# Patient Record
Sex: MALE | Race: WHITE | NOT HISPANIC OR LATINO | Employment: FULL TIME | URBAN - METROPOLITAN AREA
[De-identification: names, ages, dates, MRNs, and addresses within clinical notes are randomized per-mention and may not be internally consistent; named-entity substitution may affect disease eponyms.]

---

## 2017-04-03 ENCOUNTER — OFFICE VISIT - HEALTHEAST (OUTPATIENT)
Dept: FAMILY MEDICINE | Facility: CLINIC | Age: 52
End: 2017-04-03

## 2017-04-03 DIAGNOSIS — J06.9 VIRAL URI: ICD-10-CM

## 2018-01-17 ENCOUNTER — OFFICE VISIT (OUTPATIENT)
Dept: URGENT CARE | Facility: URGENT CARE | Age: 53
End: 2018-01-17
Payer: COMMERCIAL

## 2018-01-17 VITALS
SYSTOLIC BLOOD PRESSURE: 130 MMHG | TEMPERATURE: 98.3 F | HEART RATE: 98 BPM | OXYGEN SATURATION: 96 % | DIASTOLIC BLOOD PRESSURE: 90 MMHG

## 2018-01-17 DIAGNOSIS — S01.511A LIP LACERATION, INITIAL ENCOUNTER: Primary | ICD-10-CM

## 2018-01-17 PROCEDURE — 12011 RPR F/E/E/N/L/M 2.5 CM/<: CPT | Performed by: FAMILY MEDICINE

## 2018-01-17 NOTE — MR AVS SNAPSHOT
"              After Visit Summary   2018    Ty Silverman    MRN: [id number]           Patient Information     Date Of Birth          1965        Visit Information        Provider Department      2018 5:45 PM Jaunjo Connors MD [facility] Urgent [facility]        Today's Diagnoses     Lip laceration, initial encounter    -  1       Follow-ups after your visit        Who to contact     If you have questions or need follow up information about today's clinic visit or your schedule please contact [facility] directly at [phone].  Normal or non-critical lab and imaging results will be communicated to you by [employer], letter or phone within 4 business days after the clinic has received the results. If you do not hear from us within 7 days, please contact the clinic through [employer] or phone. If you have a critical or abnormal lab result, we will notify you by phone as soon as possible.  Submit refill requests through [email or web address] or call your pharmacy and they will forward the refill request to us. Please allow 3 business days for your refill to be completed.          Additional Information About Your Visit        MyChart Information     [email or web address] lets you send messages to your doctor, view your test results, renew your prescriptions, schedule appointments and more. To sign up, go to [email or web address] . Click on \"Log in\" on the left side of the screen, which will take you to the Welcome page. Then click on \"Sign up Now\" on the right side of the page.     You will be asked to enter the access code listed below, as well as some personal information. Please follow the directions to create your username and password.     Your access code is: [email or web address]  Expires: 2018  6:41 PM     Your access code will  in 90 days. If you need help or a new code, please call your [facility] clinic or [phone].        Care EveryWhere ID     This is your Care EveryWhere ID. This could be used by " other organizations to access your [place] medical records  [id number]        Your Vitals Were     Pulse Temperature Pulse Oximetry             98 98.3  F (36.8  C) (Oral) 96%          Blood Pressure from Last 3 Encounters:   01/17/18 130/90    Weight from Last 3 Encounters:   No data found for Wt              We Performed the Following     REPR SUPERF WND FACE <2.5CM [[id number]]        Primary Care Provider Fax #    Physician No Ref-Primary [phone]       No address on file        Equal Access to Services     JONY HELLER : Hadii aad ku hadasho Soomaali, waaxda luqadaha, qaybta kaalmada adeegyada, waxay idiin hayaan adeeg kharacecilia laonel . So [facility] [phone].    ATENCIÓN: Si habla español, tiene a ferraro disposición servicios gratuitos de asistencia lingüística. [facility] [phone].    We comply with applicable federal civil rights laws and [place] laws. We do not discriminate on the basis of race, color, national origin, age, disability, sex, sexual orientation, or gender identity.            Thank you!     Thank you for choosing [facility]  for your care. Our goal is always to provide you with excellent care. Hearing back from our patients is one way we can continue to improve our services. Please take a few minutes to complete the written survey that you may receive in the mail after your visit with us. Thank you!             Your Updated Medication List - Protect others around you: Learn how to safely use, store and throw away your medicines at www.disposemymeds.org.      Notice  As of 1/17/2018  6:41 PM    You have not been prescribed any medications.

## 2018-01-18 NOTE — PROGRESS NOTES
SUBJECTIVE:   52 year old male sustained laceration of upper lip 1 hour ago. Nature of injury: dog bite. Tetanus vaccination status reviewed: tetanus re-vaccination not indicated.     OBJECTIVE:   Patient appears well, vitals are normal. Laceration 5 mm L upper lip noted.  Description: clean wound edges, no foreign bodies. Neurovascular and tendon structures are intact.  Crosses vermillion border    ASSESSMENT:   1. Lip laceration, initial encounter      PLAN:   Anesthesia with LET. Wound cleansed, debrided of visible foreign material and necrotic tissue, and sutured.   Pt instructed to come back to the clinic for worsening sx    Return for suture removal in 5-6 days.   6-0 Nylon x 1 SI   Pt tolerated well

## 2021-04-05 ENCOUNTER — AMBULATORY - HEALTHEAST (OUTPATIENT)
Dept: NURSING | Facility: CLINIC | Age: 56
End: 2021-04-05

## 2021-04-26 ENCOUNTER — AMBULATORY - HEALTHEAST (OUTPATIENT)
Dept: NURSING | Facility: CLINIC | Age: 56
End: 2021-04-26

## 2021-05-30 VITALS — WEIGHT: 214.9 LBS

## 2021-06-09 NOTE — PROGRESS NOTES
Assessment:     1. Viral URI            Plan:     Recommend oral rehydration with pedialyte (for children) or Gatorade (for adults), drinking small amount of fluids frequently.  Advance diet as tolerated.  Follow up if not able to keep fluids down, becoming lethargic, having high fevers, or severe abdominal pain.      Subjective:       51 y.o. male presents for evaluation of a couple week history of productive cough off and on.  Initially he had some chills and more nasal congestion but now his symptoms are just primarily the cough.  It is significantly better than it was last week.  It is mostly in the morning and is productive of yellowish sputum.  He has been still continuing to be a bit fatigued.  He denies any shortness of breath, current nasal congestion, sore throat, or ear pain.  He has not had any recent fevers.  He has not tried anything for his symptoms.  He has continued to be able to go to work daily.    The following portions of the patient's history were reviewed and updated as appropriate: allergies, current medications, past family history, past medical history, past social history, past surgical history and problem list.    Review of Systems  A 12 point comprehensive review of systems was negative except as noted.     Objective:        Vitals:    04/03/17 1214   BP: 128/88   Pulse: 84   Resp: 14   Temp: 98.2  F (36.8  C)   SpO2: 95%     General Appearance:    Alert, pleasant, cooperative, no distress, appears stated age   Head:    Normocephalic, without obvious abnormality, atraumatic   Eyes:    Conjunctiva/corneas clear   Ears:    Normal TM's without erythema or bulging. Radha external ear canals, both ears   Nose:   Nares normal, septum midline, mucosa normal, no drainage    or sinus tenderness   Throat:   Lips, mucosa, and tongue normal; teeth and gums normal.  No tonsilar hypertrophy or exudate.   Neck:    Cardiovascular:   Supple, symmetrical, trachea midline, no adenopathy;     thyroid:  no  enlargement/tenderness/nodules  Regular rate and rhythm, no murmurs, rubs, or gallops.   Lungs:     Clear to auscultation bilaterally without wheezes, rales, or rhonchi, respirations unlabored                          This note has been dictated using voice recognition software. Any grammatical or context distortions are unintentional and inherent to the software

## 2021-10-10 ENCOUNTER — HEALTH MAINTENANCE LETTER (OUTPATIENT)
Age: 56
End: 2021-10-10

## 2022-09-17 ENCOUNTER — HEALTH MAINTENANCE LETTER (OUTPATIENT)
Age: 57
End: 2022-09-17

## 2022-11-12 ENCOUNTER — OFFICE VISIT (OUTPATIENT)
Dept: URGENT CARE | Facility: URGENT CARE | Age: 57
End: 2022-11-12
Payer: COMMERCIAL

## 2022-11-12 VITALS
OXYGEN SATURATION: 97 % | HEART RATE: 72 BPM | TEMPERATURE: 97.1 F | SYSTOLIC BLOOD PRESSURE: 155 MMHG | DIASTOLIC BLOOD PRESSURE: 101 MMHG | WEIGHT: 215 LBS

## 2022-11-12 DIAGNOSIS — M10.9 ACUTE GOUT INVOLVING TOE OF RIGHT FOOT, UNSPECIFIED CAUSE: Primary | ICD-10-CM

## 2022-11-12 PROCEDURE — 99203 OFFICE O/P NEW LOW 30 MIN: CPT | Performed by: PHYSICIAN ASSISTANT

## 2022-11-12 RX ORDER — INDOMETHACIN 50 MG/1
50 CAPSULE ORAL
Qty: 21 CAPSULE | Refills: 0 | Status: SHIPPED | OUTPATIENT
Start: 2022-11-12

## 2022-11-12 RX ORDER — PREDNISONE 20 MG/1
40 TABLET ORAL DAILY
Qty: 10 TABLET | Refills: 0 | Status: SHIPPED | OUTPATIENT
Start: 2022-11-12 | End: 2022-11-17

## 2022-11-13 NOTE — PROGRESS NOTES
Assessment & Plan     Acute gout involving toe of right foot, unspecified cause  - predniSONE (DELTASONE) 20 MG tablet  Dispense: 10 tablet; Refill: 0  - indomethacin (INDOCIN) 50 MG capsule  Dispense: 21 capsule; Refill: 0     Pt presents with pain of R great toe that began yesterday. Area is erythematous, warm and painful consistent with gout. Pt will begin prednisone and indomethacin, he will discontinue tylenol and ibuprofen. Pt reports consuming large amounts of red meat, discussed dietary changes and gout triggers. Pt will follow up if symptoms are not controlled with new medications.       Anyi Marcus PA-C  [facility] LIDYA Crawford is a 57 year old male who presents to clinic today for the following health issues:  Chief Complaint   Patient presents with     Pain     Right foot pain, notes that it may be gout      HPI    Pt reports R foot pain that started yesterday  Has not had this before  10/10 pain, mobility is impacted   Area is redden and warm  Has been taking ibuprofen/tylenol       No past medical history on file.  Current Outpatient Medications   Medication     indomethacin (INDOCIN) 50 MG capsule     predniSONE (DELTASONE) 20 MG tablet     No current facility-administered medications for this visit.     Social History     Socioeconomic History     Marital status:      Spouse name: Not on file     Number of children: Not on file     Years of education: Not on file     Highest education level: Not on file   Occupational History     Not on file   Tobacco Use     Smoking status: Never     Smokeless tobacco: Not on file   Substance and Sexual Activity     Alcohol use: Not on file     Drug use: Not on file     Sexual activity: Not on file   Other Topics Concern     Not on file   Social History Narrative     Not on file     Social Determinants of Health     Financial Resource Strain: Not on file   Food Insecurity: Not on file   Transportation Needs: Not on  file   Physical Activity: Not on file   Stress: Not on file   Social Connections: Not on file   Intimate Partner Violence: Not on file   Housing Stability: Not on file         Review of Systems  Detailed as above       Objective    BP (!) 155/101   Pulse 72   Temp 97.1  F (36.2  C) (Oral)   Wt 97.5 kg (215 lb)   SpO2 97%   Physical Exam  Cardiovascular:      Rate and Rhythm: Normal rate and regular rhythm.      Heart sounds: Normal heart sounds.   Pulmonary:      Effort: Pulmonary effort is normal.      Breath sounds: Normal breath sounds.   Musculoskeletal:      Right foot: Decreased range of motion.   Feet:      Right foot:      Skin integrity: Erythema and warmth present.   Skin:     General: Skin is warm and dry.   Neurological:      Mental Status: He is alert.   Psychiatric:         Mood and Affect: Mood normal.

## 2023-01-02 ENCOUNTER — OFFICE VISIT (OUTPATIENT)
Dept: PODIATRY | Facility: CLINIC | Age: 58
End: 2023-01-02
Payer: COMMERCIAL

## 2023-01-02 VITALS — OXYGEN SATURATION: 97 % | BODY MASS INDEX: 34.07 KG/M2 | HEIGHT: 69 IN | WEIGHT: 230 LBS | HEART RATE: 84 BPM

## 2023-01-02 DIAGNOSIS — M72.2 PLANTAR FASCIITIS: Primary | ICD-10-CM

## 2023-01-02 PROCEDURE — 99203 OFFICE O/P NEW LOW 30 MIN: CPT | Performed by: PODIATRIST

## 2023-01-02 ASSESSMENT — PAIN SCALES - GENERAL: PAINLEVEL: SEVERE PAIN (7)

## 2023-01-02 NOTE — PATIENT INSTRUCTIONS
What are Prescription Custom Orthotics?  Custom orthotics are specially-made devices designed to support and comfort your feet. Prescription orthotics are crafted for you and no one else. They match the contours of your feet precisely and are designed for the way you move. Orthotics are only manufactured after a podiatrist has conducted a complete evaluation of your feet, ankles, and legs, so the orthotic can accommodate your unique foot structure and pathology.  Prescription orthotics are divided into two categories:  Functional orthotics are designed to control abnormal motion. They may be used to treat foot pain caused by abnormal motion; they can also be used to treat injuries such as shin splints or tendinitis. Functional orthotics are usually crafted of a semi-rigid material such as plastic or graphite.  Accommodative orthotics are softer and meant to provide additional cushioning and support. They can be used to treat diabetic foot ulcers, painful calluses on the bottom of the foot, and other uncomfortable conditions.  Podiatrists use orthotics to treat foot problems such as plantar fasciitis, bursitis, tendinitis, diabetic foot ulcers, and foot, ankle, and heel pain. Clinical research studies have shown that podiatrist-prescribed foot orthotics decrease foot pain and improve function.  Orthotics typically cost more than shoe inserts purchased in a retail store, but the additional cost is usually well worth it. Unlike shoe inserts, orthotics are molded to fit each individual foot, so you can be sure that your orthotics fit and do what they're supposed to do. Prescription orthotics are also made of top-notch materials and last many years when cared for properly. Insurance often helps pay for prescription orthotics.  What are Shoe Inserts?   You've seen them at the grocery store and at the mall. You've probably even seen them on TV and online. Shoe inserts are any kind of non-prescription foot support designed  to be worn inside a shoe. Pre-packaged, mass produced, arch supports are shoe inserts. So are the  custom-made  insoles and foot supports that you can order online or at retail stores. Unless the device has been prescribed by a doctor and crafted for your specific foot, it's a shoe insert, not a custom orthotic device--despite what the ads might say.  Shoe inserts can be very helpful for a variety of foot ailments, including flat arches and foot and leg pain. They can cushion your feet, provide comfort, and support your arches, but they can't correct biomechanical foot problems or cure long-standing foot issues.  The most common types of shoe inserts are:  Arch supports: Some people have high arches. Others have low arches or flat feet. Arch supports generally have a  bumped-up  appearance and are designed to support the foot's natural arch.   Insoles: Insoles slip into your shoe to provide extra cushioning and support. Insoles are often made of gel, foam, or plastic.   Heel liners: Heel liners, sometimes called heel pads or heel cups, provide extra cushioning in the heel region. They may be especially useful for patients who have foot pain caused by age-related thinning of the heels' natural fat pads.   Foot cushions: Do your shoes rub against your heel or your toes? Foot cushions come in many different shapes and sizes and can be used as a barrier between you and your shoe.  Choosing an Over-the-Counter Shoe Insert  Selecting a shoe insert from the wide variety of devices on the market can be overwhelming. Here are some podiatrist-tested tips to help you find the insert that best meets your needs:  Consider your health. Do you have diabetes? Problems with circulation? An over-the-counter insert may not be your best bet. Diabetes and poor circulation increase your risk of foot ulcers and infections, so schedule an appointment with a podiatrist. He or she can help you select a solution that won't cause additional  health problems.   Think about the purpose. Are you planning to run a marathon, or do you just need a little arch support in your work shoes? Look for a product that fits your planned level of activity.   Bring your shoes. For the insert to be effective, it has to fit into your shoes. So bring your sneakers, dress shoes, or work boots--whatever you plan to wear with your insert. Look for an insert that will fit the contours of your shoe.   Try them on. If all possible, slip the insert into your shoe and try it out. Walk around a little. How does it feel? Don't assume that feelings of pressure will go away with continued wear. (If you can't try the inserts at the store, ask about the store's return policy and hold on to your receipt.)    Please call one of the [place] locations below to schedule an appointment. If you received a prescription please bring it with you to your appointment. Some locations are limited to what they carry.    Office Locations    [facility]  [address] [place] [address]  Home Medical Equipment, [address]   Phone: [phone]   Orthotics and Prosthetics, [address]   Phone: [phone]    [facility]  [address]  [address]   [place] [address]   Phone: [phone]    [facility] Professional [facility]ldg[facility]  [address]  [place] [address]  Phone: [phone]    [facility] Medical [facility]ld[facility]   [address]  [place] [address]  Phone: [phone]    [facility]  [address]  [place] [address]  Phone: [phone]    [facility]  [address]  [place] [address]  Phone: [phone]    [place]   [address] [place] [address]   Phone: [phone]    WEARING YOUR CUSTOM FOOT ORTHOTICS   Most  insurance plans cover one pair of orthotics per year. You must check with your   insurance plan to see what your payment responsibility will be. Please call your   insurance company by calling the number on the back of your insurance card.   Orthotic's are non-refundable and non-returnable.   Orthotics are made of various designs. Some orthotics are covered with material that extends beyond your toes. If your orthotic is of this design, you will likely need to trim the toe end to get a proper fit. The insole from your shoe can be used as a template. Simply overlay the shoe insert on top of the custom orthotic. Align the heel end while tracing the length of the insert onto the custom orthotic. Use a large scissor to trim the toe end until you get a proper fit in the shoe.   The orthotic needs to be pushed as far back in the shoe as possible. The heel portion should not ride forward so as not to irritate your heel.   Orthotics are designed to work with socks. Excessive perspiration will shorten the life span of the orthotics. Remove the orthotic from the shoe frequently for proper drying.   The break-in period lasts for weeks. People new to orthotics will likely experience new aches and pains. The orthotic is forcing your foot into a new position. Arch, foot and leg muscle aches and fatigue are common during these weeks. Minor discomfort can be considered normal break in phenomenon. Start wearing your orthotic around your home your first day. Limited activity for one to two hours is recommended. You can increase one or two additional hours each day provided the aches and pains are subsiding. The degree of discomfort, fatigue and problems will dictate the speed of break in. You may require multiple weeks to work up to full time use.   Do not continue wearing your orthotics if they are creating problems such as blisters or sores. Do not hesitate to call the clinic to speak with a nurse regarding orthotic   break in,  fit, trimming, etc. You may also need to see the doctor if the orthotics are   simply not working out. Adjustments are sometimes made to improve orthotic   function.     Orthotics will only work in certain styles and types of shoes. Orthotics rarely work in dress shoes. Slip-ons, clogs, sandals and heels are particularly troublesome. Specially designed orthotics may be necessary for these types of shoes. Your custom orthotic was designed for activities that require appropriate walking or running shoes. Lace up athletic shoes, walking shoes or work boots should work appropriately. You may need a wider or longer shoe. Shoes with a removable  or insert work best. In general, you want to remove an insert from the shoe before placing the orthotic into the shoe. Shoes without a removable liner may not work as well.     When purchasing new shoes, bring your orthotics along to get a proper fit. Shop at stores that are familiar with orthotics.   Frequent washing of the orthotic may shorten the life span of the top cover. The top cover can be replaced but will generally last one to five years depending on use and foot perspiration.

## 2023-01-02 NOTE — PROGRESS NOTES
FOOT AND ANKLE SURGERY/PODIATRY CONSULT NOTE         ASSESSMENT:   Plantar fasciitis left foot      TREATMENT:  I have recommended orthotics.  After wearing his orthotics consistently for 2 to 3 months if his pain persist I recommended the patient return to the clinic for follow-up visit at which time an x-ray of the left foot will be recommended.        HPI: Ty Silverman presented to the clinic today complaining of moderate to severe pain in the bottom of the left heel.  The patient indicated that he has had this heel pain for approximately 6 months.  The pain is more pronounced when he first gets out of bed in the mornings.  The pain is isolated to the bottom of the left heel.  It is easily relieved with nonweightbearing.  He denies any trauma to his left foot.  He has not had any associated redness or swelling.  The patient does have a history of plantar fasciitis several years ago.  He denies any other previous treatment     History reviewed. No pertinent past medical history.    Social History     Socioeconomic History     Marital status:      Spouse name: Not on file     Number of children: Not on file     Years of education: Not on file     Highest education level: Not on file   Occupational History     Not on file   Tobacco Use     Smoking status: Never     Smokeless tobacco: Not on file   Substance and Sexual Activity     Alcohol use: Not on file     Drug use: Not on file     Sexual activity: Not on file   Other Topics Concern     Not on file   Social History Narrative     Not on file     Social Determinants of Health     Financial Resource Strain: Not on file   Food Insecurity: Not on file   Transportation Needs: Not on file   Physical Activity: Not on file   Stress: Not on file   Social Connections: Not on file   Intimate Partner Violence: Not on file   Housing Stability: Not on file        No Known Allergies       Current Outpatient Medications:      indomethacin (INDOCIN) 50 MG capsule, Take 1  "capsule (50 mg) by mouth 3 times daily (with meals) (Patient not taking: Reported on 1/2/2023), Disp: 21 capsule, Rfl: 0     Family History   Problem Relation Age of Onset     No Known Problems Mother      No Known Problems Father      No Known Problems Maternal Grandmother      No Known Problems Maternal Grandfather      No Known Problems Paternal Grandmother      No Known Problems Paternal Grandfather      No Known Problems Brother      No Known Problems Sister         Social History     Socioeconomic History     Marital status:      Spouse name: Not on file     Number of children: Not on file     Years of education: Not on file     Highest education level: Not on file   Occupational History     Not on file   Tobacco Use     Smoking status: Never     Smokeless tobacco: Not on file   Substance and Sexual Activity     Alcohol use: Not on file     Drug use: Not on file     Sexual activity: Not on file   Other Topics Concern     Not on file   Social History Narrative     Not on file     Social Determinants of Health     Financial Resource Strain: Not on file   Food Insecurity: Not on file   Transportation Needs: Not on file   Physical Activity: Not on file   Stress: Not on file   Social Connections: Not on file   Intimate Partner Violence: Not on file   Housing Stability: Not on file        Review of Systems - Patient denies fever, chills, rash, wound, stiffness, limping, numbness, weakness, heart burn, blood in stool, chest pain with activity, calf pain when walking, shortness of breath with activity, chronic cough, easy bleeding/bruising, swelling of ankles, excessive thirst, fatigue, depression, anxiety.  Patient admits to left heel pain.      OBJECTIVE:  Appearance: alert, well appearing, and in no distress.    Pulse 84   Ht 1.753 m (5' 9\")   Wt 104.3 kg (230 lb)   SpO2 97%   BMI 33.97 kg/m       Body mass index is 33.97 kg/m .     General appearance: Patient is alert and fully cooperative with history " & exam.  No sign of distress is noted during the visit.  Psychiatric: Affect is pleasant & appropriate.  Patient appears motivated to improve health.  Respiratory: Breathing is regular & unlabored while sitting.  HEENT: Hearing is intact to spoken word.  Speech is clear.  No gross evidence of visual impairment that would impact ambulation.    Vascular: Dorsalis pedis and posterior tibial pulses are palpable. There is no pedal hair growth bilaterally.  CFT < 3 sec from anterior tibial surface to distal digits bilaterally. There is no appreciable edema noted.  Dermatologic: Turgor and texture are within normal limits. No coloration or temperature changes. No primary or secondary lesions noted.  Neurologic: All epicritic and proprioceptive sensations are grossly intact bilaterally.  Musculoskeletal: All active and passive ankle, subtalar, midtarsal, and 1st MPJ range of motion are grossly intact without pain or crepitus, with the exception of none. Manual muscle strength is within normal limits bilaterally. All dorsiflexors, plantarflexors, invertors, evertors are intact bilaterally.  Minimal tenderness present to the left heel on palpation.  No tenderness to the left foot or ankle with range of motion. Calf is soft/non-tenderwithout warmth/induration    Imaging:       No images are attached to the encounter or orders placed in the encounter.     No results found.   No results found.         Mitesh Sparrow DPM  [facility] Ankle Surgery/Podiatry

## 2023-01-28 ENCOUNTER — HEALTH MAINTENANCE LETTER (OUTPATIENT)
Age: 58
End: 2023-01-28

## 2024-02-25 ENCOUNTER — HEALTH MAINTENANCE LETTER (OUTPATIENT)
Age: 59
End: 2024-02-25

## 2024-03-21 ENCOUNTER — OFFICE VISIT (OUTPATIENT)
Dept: PEDIATRICS | Facility: CLINIC | Age: 59
End: 2024-03-21
Payer: COMMERCIAL

## 2024-03-21 ENCOUNTER — TELEPHONE (OUTPATIENT)
Dept: PEDIATRICS | Facility: CLINIC | Age: 59
End: 2024-03-21

## 2024-03-21 VITALS
TEMPERATURE: 98.6 F | BODY MASS INDEX: 35.15 KG/M2 | SYSTOLIC BLOOD PRESSURE: 161 MMHG | HEIGHT: 69 IN | DIASTOLIC BLOOD PRESSURE: 111 MMHG | WEIGHT: 237.3 LBS | RESPIRATION RATE: 16 BRPM | OXYGEN SATURATION: 97 % | HEART RATE: 76 BPM

## 2024-03-21 DIAGNOSIS — M10.9 ACUTE GOUT INVOLVING TOE OF RIGHT FOOT, UNSPECIFIED CAUSE: Primary | ICD-10-CM

## 2024-03-21 DIAGNOSIS — Z13.220 LIPID SCREENING: ICD-10-CM

## 2024-03-21 PROCEDURE — 80048 BASIC METABOLIC PNL TOTAL CA: CPT

## 2024-03-21 PROCEDURE — 80061 LIPID PANEL: CPT

## 2024-03-21 PROCEDURE — 36415 COLL VENOUS BLD VENIPUNCTURE: CPT

## 2024-03-21 PROCEDURE — 99213 OFFICE O/P EST LOW 20 MIN: CPT | Mod: GE

## 2024-03-21 PROCEDURE — 84550 ASSAY OF BLOOD/URIC ACID: CPT

## 2024-03-21 RX ORDER — INDOMETHACIN 50 MG/1
50 CAPSULE ORAL
Qty: 21 CAPSULE | Refills: 0 | Status: CANCELLED | OUTPATIENT
Start: 2024-03-21

## 2024-03-21 RX ORDER — COLCHICINE 0.6 MG/1
TABLET ORAL
Qty: 12 TABLET | Refills: 0 | Status: SHIPPED | OUTPATIENT
Start: 2024-03-21

## 2024-03-21 RX ORDER — PREDNISONE 20 MG/1
20 TABLET ORAL DAILY
Qty: 10 TABLET | Refills: 0 | Status: CANCELLED | OUTPATIENT
Start: 2024-03-21

## 2024-03-21 RX ORDER — COLCHICINE 0.6 MG/1
TABLET ORAL
Qty: 11 TABLET | Refills: 0 | Status: SHIPPED | OUTPATIENT
Start: 2024-03-21 | End: 2024-03-21

## 2024-03-21 ASSESSMENT — PAIN SCALES - GENERAL: PAINLEVEL: SEVERE PAIN (7)

## 2024-03-21 NOTE — PROGRESS NOTES
"  Assessment & Plan     (M10.9) Acute gout involving toe of right foot, unspecified cause  (primary encounter diagnosis)  Comment: Patient with redness, swelling, and warmth to the right great toe at the proximal phalanx consistent with acute gout flare.  Last gout flare was in 2022 and was treated with prednisone and indomethacin.  Discussed treatment options with the patient.  With shared decision-making, decided to start colchicine prescription for 7-10 days.  Recommended drawing labs today including uric acid level and BMP and patient was amenable to this.  Patient is hypertensive today in clinic and when looking through his chart, he has been hypertensive multiple times during visits.  Discussed the possibility of starting losartan for treating gout and BP, and patient would prefer to try to lose 10 pounds over the next 4-6 weeks and increase his exercise and recheck his blood pressure when he does not have an acute gout flare, which is reasonable. Will plan for follow up in 4-6 weeks.   Plan: -Colchicine 1.2 mg on day one followed by 0.6 mg one hour later, and then 7-10 day course            -Follow up in 4-6 weeks for preventative visit and further discussions about BP    Lipid screening  -Plan: lipid panel today    BMI  Estimated body mass index is 35.35 kg/m  as calculated from the following:    Height as of this encounter: 1.745 m (5' 8.7\").    Weight as of this encounter: 107.6 kg (237 lb 4.8 oz).   Weight management plan: Discussed healthy diet and exercise guidelines    Patient staffed with attending physician, Dr. Portillo.     Gabino Crawford is a 58 year old, presenting for the following health issues:  Arthritis      3/21/2024     9:06 AM   Additional Questions   Roomed by kylie   Accompanied by self     History of Present Illness       Reason for visit:  GOUT FLARE UP    He eats 0-1 servings of fruits and vegetables daily.He consumes 0 sweetened beverage(s) daily.He exercises with enough effort to " "increase his heart rate 30 to 60 minutes per day.  He exercises with enough effort to increase his heart rate 4 days per week.   He is taking medications regularly.     - Recent increase in pain, redness and swelling to the right great toe since yesterday  - Last time, gout flare was right great toe  - No increases in red meat but does eat a significant amount and recently at turkey chili over the weekend  - No fevers, chills, spreading redness    - BP elevated and thinks this is due to his weight and lack of exercise  - No meds in the past  - No light headedness, dizziness at this time, no headaches      Review of Systems  Constitutional, HEENT, cardiovascular, pulmonary, GI, , musculoskeletal, neuro, skin, endocrine and psych systems are negative, except as otherwise noted.      Objective    BP (!) 172/120   Pulse 83   Temp 98.6  F (37  C)   Resp 16   Ht 1.745 m (5' 8.7\")   Wt 107.6 kg (237 lb 4.8 oz)   SpO2 97%   BMI 35.35 kg/m    Body mass index is 35.35 kg/m .  Physical Exam   GENERAL: alert and no distress  RESP: lungs clear to auscultation - no rales, rhonchi or wheezes  CV: regular rate and rhythm, normal S1 S2, no S3 or S4, no murmur, click or rub, no peripheral edema  ABDOMEN: soft, nontender, no hepatosplenomegaly, no masses and bowel sounds normal  MS: no gross musculoskeletal defects noted, no edema  Right foot: Right great toe with erythema, warmth, and swelling to the 1st proximal phalanx. No spreading of redness. No other erythematous, warm joints noted on exam.   SKIN: no suspicious lesions or rashes  NEURO: Normal strength and tone, mentation intact and speech normal  PSYCH: mentation appears normal, affect normal/bright          Signed Electronically by: Akash Ortiz MD    "

## 2024-03-21 NOTE — PATIENT INSTRUCTIONS
It was pleasure to meet you. We think you have an acute gout flare. We will send you home with:    -Colchicine for 7-10 days (take 2 tablets once you get home followed by one tablet one hour later, then take one tablet daily for at least 7 days or until you run out)  -Schedule a preventative visit in 4-6 weeks for labs

## 2024-03-21 NOTE — TELEPHONE ENCOUNTER
Pt calls.    He had gout in the past.  He is c/o pain on his right foot big toe.  He wants a refill of gout medicine.  It starting hurting yesterday.  It is red.  He is rating the pain 8/10.  He is sure it is the same as when he had gout.    He wants a refill.  Advised he was seen in Urgent Care in 2022.  We don't give refills from Urgent Care.  He has never been seen in the clinic.  Advised he would need an appt.      Not finding a protocol for this.      Appt scheduled for this morning.  Advised it would be a good idea to establish with a primary care provider.  He says he does not like to go to the doctor and will be getting  benefits when he turns 59 and will think about it then.      Appointments in Next Year      Mar 21, 2024  9:00 AM  (Arrive by 8:40 AM)  Provider Visit with Akash Cornejo MD  [facility] ([facility] ) [phone]

## 2024-03-22 LAB
ANION GAP SERPL CALCULATED.3IONS-SCNC: 12 MMOL/L (ref 7–15)
BUN SERPL-MCNC: 17.8 MG/DL (ref 8–23)
CALCIUM SERPL-MCNC: 8.2 MG/DL (ref 8.6–10)
CHLORIDE SERPL-SCNC: 101 MMOL/L (ref 98–107)
CHOLEST SERPL-MCNC: 315 MG/DL
CREAT SERPL-MCNC: 0.91 MG/DL (ref 0.67–1.17)
DEPRECATED HCO3 PLAS-SCNC: 25 MMOL/L (ref 22–29)
EGFRCR SERPLBLD CKD-EPI 2021: >90 ML/MIN/1.73M2
FASTING STATUS PATIENT QL REPORTED: YES
GLUCOSE SERPL-MCNC: 96 MG/DL (ref 70–99)
HDLC SERPL-MCNC: 51 MG/DL
LDLC SERPL CALC-MCNC: 234 MG/DL
NONHDLC SERPL-MCNC: 264 MG/DL
POTASSIUM SERPL-SCNC: 4.9 MMOL/L (ref 3.4–5.3)
SODIUM SERPL-SCNC: 138 MMOL/L (ref 135–145)
TRIGL SERPL-MCNC: 149 MG/DL
URATE SERPL-MCNC: 8.4 MG/DL (ref 3.4–7)

## 2024-03-24 NOTE — RESULT ENCOUNTER NOTE
"  Dear Ty,    I have reviewed your lab result - below are my recommendations.    1. Your uric acid is high, consistent with your gout flare.  This is something that you should discuss rechecking after 4-6 weeks.    2. The results of your recent lipid (cholesterol) profile were abnormal.  Specifically, your LDL (aka \"bad\" cholesterol) is very high at 234.      Based on your cholesterol profile and other risk factors, the guidelines recommend starting a cholesterol medication in addition to lifestyle modifications to lower your risk of heart disease and stroke.  I recommend you discuss the benefits and risks of starting a cholesterol medication and come up with a personalized plan of care at your next physical, which I recommend you make in the next couple of months.      In the mean time, see below for lifestyle modifications to improve your heart and vessel health.    Here are some ways to improve your nutrition:  - Eat less fat (especially butter, Crisco and other saturated fats)  - Buy lean cuts of meat, reduce your portions of red meat or substitute poultry or fish  - Eat no more than 4 egg yolks per week  - Avoid fried or fast foods that are high in fat  - Eat more fruits and vegetables  - Reduce the percent of calories from saturated fat and trans fat (to less than 5-10% of total calories consumed)  - Limits intake of sweets, sugar-sweetened beverages, and red meats  - Increase intake of leafy green vegetables, fruits, and whole grains.  - A healthy balanced diet can include low-fat dairy products, poultry, fish, legumes, nontropical vegetable oils, and nuts    Also consider starting or increasing your aerobic activity. Aerobic activity is the best way to improve HDL (good) cholesterol.   - 3 or 4 physical activity sessions/week  - Average duration 40 minutes/session  - Activity should be moderate-to-vigorous intensity (I recommend a mixture of cardiovascular and strength training).    The remainder of your " lab results are normal.  Please feel free to call with further questions.     Sincerely,    Nabil Portillo MD

## 2025-03-09 ENCOUNTER — HEALTH MAINTENANCE LETTER (OUTPATIENT)
Age: 60
End: 2025-03-09